# Patient Record
Sex: FEMALE | URBAN - METROPOLITAN AREA
[De-identification: names, ages, dates, MRNs, and addresses within clinical notes are randomized per-mention and may not be internally consistent; named-entity substitution may affect disease eponyms.]

---

## 2020-07-08 ENCOUNTER — TELEPHONE (OUTPATIENT)
Dept: ENDOCRINOLOGY | Facility: CLINIC | Age: 55
End: 2020-07-08

## 2020-07-08 NOTE — TELEPHONE ENCOUNTER
Refill request received from [facility] pharmacy in [place] for alendronate 70 mg tablets. Patient has never seen Dr. Mackenzie at [facility]. Faxed request back with note that patient needs to establish care with us. Attempted to call patient however the number on file is out of service.    Yohan Cho RN....7/8/2020 8:40 AM